# Patient Record
Sex: FEMALE | Race: WHITE | URBAN - METROPOLITAN AREA
[De-identification: names, ages, dates, MRNs, and addresses within clinical notes are randomized per-mention and may not be internally consistent; named-entity substitution may affect disease eponyms.]

---

## 2017-09-20 ENCOUNTER — HISTORICAL (OUTPATIENT)
Dept: INTERNAL MEDICINE | Facility: CLINIC | Age: 62
End: 2017-09-20

## 2017-09-20 LAB
ABS NEUT (OLG): 6.94 X10(3)/MCL (ref 2.1–9.2)
ALBUMIN SERPL-MCNC: 3.7 GM/DL (ref 3.4–5)
ALBUMIN/GLOB SERPL: 1 RATIO (ref 1–2)
ALP SERPL-CCNC: 86 UNIT/L (ref 45–117)
ALT SERPL-CCNC: 19 UNIT/L (ref 12–78)
AST SERPL-CCNC: 13 UNIT/L (ref 15–37)
BASOPHILS # BLD AUTO: 0.04 X10(3)/MCL
BASOPHILS NFR BLD AUTO: 0 % (ref 0–1)
BILIRUB SERPL-MCNC: 0.6 MG/DL (ref 0.2–1)
BILIRUBIN DIRECT+TOT PNL SERPL-MCNC: 0.2 MG/DL
BILIRUBIN DIRECT+TOT PNL SERPL-MCNC: 0.4 MG/DL
BUN SERPL-MCNC: 22 MG/DL (ref 7–18)
CALCIUM SERPL-MCNC: 9.1 MG/DL (ref 8.5–10.1)
CHLORIDE SERPL-SCNC: 101 MMOL/L (ref 98–107)
CHOLEST SERPL-MCNC: 149 MG/DL
CHOLEST/HDLC SERPL: 3 {RATIO} (ref 0–4.4)
CO2 SERPL-SCNC: 29 MMOL/L (ref 21–32)
CREAT SERPL-MCNC: 1.5 MG/DL (ref 0.6–1.3)
EOSINOPHIL # BLD AUTO: 0.2 X10(3)/MCL
EOSINOPHIL NFR BLD AUTO: 2 % (ref 0–5)
ERYTHROCYTE [DISTWIDTH] IN BLOOD BY AUTOMATED COUNT: 12.3 % (ref 11.5–14.5)
EST. AVERAGE GLUCOSE BLD GHB EST-MCNC: 189 MG/DL
GLOBULIN SER-MCNC: 3.9 GM/ML (ref 2.3–3.5)
GLUCOSE SERPL-MCNC: 241 MG/DL (ref 74–106)
HAV IGM SERPL QL IA: NONREACTIVE
HBA1C MFR BLD: 8.2 % (ref 4.2–6.3)
HBV CORE IGM SERPL QL IA: NONREACTIVE
HBV SURFACE AG SERPL QL IA: NEGATIVE
HCT VFR BLD AUTO: 42.8 % (ref 35–46)
HCV AB SERPL QL IA: NONREACTIVE
HDLC SERPL-MCNC: 50 MG/DL
HGB BLD-MCNC: 14.7 GM/DL (ref 12–16)
HIV 1+2 AB+HIV1 P24 AG SERPL QL IA: NONREACTIVE
IMM GRANULOCYTES # BLD AUTO: 0.02 10*3/UL
IMM GRANULOCYTES NFR BLD AUTO: 0 %
LDLC SERPL CALC-MCNC: 50 MG/DL (ref 0–130)
LYMPHOCYTES # BLD AUTO: 2.15 X10(3)/MCL
LYMPHOCYTES NFR BLD AUTO: 22 % (ref 15–40)
MCH RBC QN AUTO: 30.2 PG (ref 26–34)
MCHC RBC AUTO-ENTMCNC: 34.3 GM/DL (ref 31–37)
MCV RBC AUTO: 88.1 FL (ref 80–100)
MONOCYTES # BLD AUTO: 0.5 X10(3)/MCL
MONOCYTES NFR BLD AUTO: 5 % (ref 4–12)
NEUTROPHILS # BLD AUTO: 6.94 X10(3)/MCL
NEUTROPHILS NFR BLD AUTO: 70 X10(3)/MCL
PLATELET # BLD AUTO: 314 X10(3)/MCL (ref 130–400)
PMV BLD AUTO: 9.9 FL (ref 7.4–10.4)
POTASSIUM SERPL-SCNC: 4.3 MMOL/L (ref 3.5–5.1)
PROT SERPL-MCNC: 7.6 GM/DL (ref 6.4–8.2)
RBC # BLD AUTO: 4.86 X10(6)/MCL (ref 4–5.2)
SODIUM SERPL-SCNC: 138 MMOL/L (ref 136–145)
T4 FREE SERPL-MCNC: 1.32 NG/DL (ref 0.76–1.46)
TRIGL SERPL-MCNC: 245 MG/DL
TSH SERPL-ACNC: 3.84 MIU/L (ref 0.36–3.74)
VLDLC SERPL CALC-MCNC: 49 MG/DL
WBC # SPEC AUTO: 9.8 X10(3)/MCL (ref 4.5–11)

## 2018-02-20 ENCOUNTER — HISTORICAL (OUTPATIENT)
Dept: ADMINISTRATIVE | Facility: HOSPITAL | Age: 63
End: 2018-02-20

## 2018-02-20 LAB
APPEARANCE, UA: ABNORMAL
BACTERIA #/AREA URNS AUTO: ABNORMAL /[HPF]
BILIRUB UR QL STRIP: NEGATIVE
COLOR UR: YELLOW
GLUCOSE (UA): NORMAL
HGB UR QL STRIP: 0.06 MG/DL
HYALINE CASTS #/AREA URNS LPF: ABNORMAL /[LPF]
KETONES UR QL STRIP: NEGATIVE
LEUKOCYTE ESTERASE UR QL STRIP: 500 LEU/UL
NITRITE UR QL STRIP: NEGATIVE
PH UR STRIP: 5 [PH] (ref 4.5–8)
PROT UR QL STRIP: 20 MG/DL
RBC #/AREA URNS AUTO: ABNORMAL /[HPF]
SP GR UR STRIP: 1.01 (ref 1–1.03)
SQUAMOUS #/AREA URNS LPF: >100 /[LPF]
UROBILINOGEN UR STRIP-ACNC: NORMAL
WBC #/AREA URNS AUTO: ABNORMAL /HPF

## 2018-02-22 LAB — FINAL CULTURE: NORMAL

## 2018-05-04 ENCOUNTER — HISTORICAL (OUTPATIENT)
Dept: ADMINISTRATIVE | Facility: HOSPITAL | Age: 63
End: 2018-05-04

## 2018-05-07 ENCOUNTER — HISTORICAL (OUTPATIENT)
Dept: ADMINISTRATIVE | Facility: HOSPITAL | Age: 63
End: 2018-05-07

## 2018-05-09 LAB — FINAL CULTURE: NO GROWTH

## 2018-08-21 ENCOUNTER — HISTORICAL (OUTPATIENT)
Dept: INTERNAL MEDICINE | Facility: CLINIC | Age: 63
End: 2018-08-21

## 2018-08-21 LAB
APPEARANCE, UA: ABNORMAL
BACTERIA #/AREA URNS AUTO: ABNORMAL /[HPF]
BILIRUB UR QL STRIP: NEGATIVE
COLOR UR: YELLOW
CREAT UR-MCNC: 275 MG/DL
EST. AVERAGE GLUCOSE BLD GHB EST-MCNC: 146 MG/DL
GLUCOSE (UA): NORMAL
HBA1C MFR BLD: 6.7 % (ref 4.2–6.3)
HGB UR QL STRIP: NEGATIVE
HYALINE CASTS #/AREA URNS LPF: ABNORMAL /[LPF]
KETONES UR QL STRIP: NEGATIVE
LEUKOCYTE ESTERASE UR QL STRIP: 500 LEU/UL
MICROALBUMIN UR-MCNC: 81 MG/L (ref 0–19)
MICROALBUMIN/CREAT RATIO PNL UR: 29.5 MCG/MG CR (ref 0–29)
NITRITE UR QL STRIP: ABNORMAL
PH UR STRIP: 5.5 [PH] (ref 4.5–8)
PROT UR QL STRIP: 30 MG/DL
RBC #/AREA URNS AUTO: ABNORMAL /[HPF]
SP GR UR STRIP: 1.02 (ref 1–1.03)
SQUAMOUS #/AREA URNS LPF: >100 /[LPF]
TSH SERPL-ACNC: 3.09 MIU/L (ref 0.36–3.74)
UROBILINOGEN UR STRIP-ACNC: NORMAL
WBC #/AREA URNS AUTO: >=100 /HPF

## 2018-08-22 ENCOUNTER — HISTORICAL (OUTPATIENT)
Dept: ADMINISTRATIVE | Facility: HOSPITAL | Age: 63
End: 2018-08-22

## 2022-04-10 ENCOUNTER — HISTORICAL (OUTPATIENT)
Dept: ADMINISTRATIVE | Facility: HOSPITAL | Age: 67
End: 2022-04-10

## 2022-04-27 VITALS
HEIGHT: 65 IN | DIASTOLIC BLOOD PRESSURE: 78 MMHG | BODY MASS INDEX: 39.2 KG/M2 | SYSTOLIC BLOOD PRESSURE: 120 MMHG | WEIGHT: 235.25 LBS

## 2022-05-04 NOTE — HISTORICAL [facility] CERNER
This is a historical note converted from Jaqui. Formatting and pictures may have been removed.  Please reference Ceraram for original formatting and attached multimedia. Chief Complaint  annual  History of Present Illness  Pt is  (hx of SABx1 and EABx1) here for annual gyn exam. Reports hx of abnormal pap in the  treated with cryotherapy. Last pap approx 14 years ago. Pt reports had uterine ablation in  secondary to AUB. Denies any vaginal bleeding since procedure. Denies vaginal discharge. States has not been sexually active in years but is getting  in April. Denies breast complaints. C/o mixed urinary incontinence. Denies dysuria. Denies hx of bladder/renal stones. Reports hx of CVA with residual right sided numbness. State does have sensation when she needs to urinate. Reports had UTI in December and was asymptomatic when discovered. Denies hx of recurring UTIs.?Pt is nonsmoker. Denies fly hx of breast, ovarian or uterine cancer. States sibling with hx of vulvar cancer.  Review of Systems  Negative except HPI  Physical Exam  Vitals & Measurements  T:?36.6? ?C (Oral)? RR:?18? BP:?129/69?  HT:?162?cm? HT:?162?cm? WT:?107.6?kg? WT:?107.6?kg? BMI:?41?  General: Alert and oriented, No acute distress.  Breast: No mass, No tenderness, No discharge.  Gastrointestinal: Soft, Non-tender.  Gynecology:  Labia: Bilateral, Majora, Minora, Within normal limits.  Vagina: atrophic mucosa  Cervix: No cervical motion tenderness, No lesions.  Uterus: Mobile, Not tender.  Ovaries: Not tender, No mass.  pt able to maintain urinary continence with valsava  Integumentary: Warm, Dry.  Neurologic: Alert, Oriented.  Psychiatric: Cooperative, Appropriate mood & affect.  Assessment/Plan  1.?Pap smear for cervical cancer screening  ? -pap,hpv  rtc one year  Ordered:  Clinic Follow up  Pathology Gyn Request  Preventative Health Care Est 40-64 years [id number] PC  ?  2.?Visit for screening mammogram  Ordered:    Screening  Preventative Health Care Est 40-64 years [id number] PC  ?  3.?Urinary incontinence, mixed  Ordered:  Internal Referral to Uro-Gynecology  Preventative Health Care Est 40-64 years [id number] PC  Urinalysis with Microscopic if Indicated  Urine Culture [id number]  ?   Problem List/Past Medical History  Ongoing  Apnea, sleep  Boil, back  CKD (chronic kidney disease) stage 2, GFR 60-89 ml/min  CKD (chronic kidney disease) stage 3, GFR 30-59 ml/min  Depression  Diabetes  Diabetes  DM neuropathy, painful  Gout  H/O: stroke  HLD (hyperlipidemia)  HTN (hypertension)  Hypothyroidism  Renal failure  Well adult exam  Historical  Hyperlipidemia  Procedure/Surgical History  Ablation (2003), Dilation and curettage (2003), Tubal ligation.  Medications  allopurinol 100 mg oral tablet, 100 mg= 1 tab(s), Oral, Daily  BD Ultra fine pen needles, See Instructions, 4 refills  CALCITRIOL 0.25MG, Daily  Celexa 20 mg oral tablet, 20 mg= 1 tab(s), Oral, Daily, 4 refills  Coreg 12.5 mg oral tablet, 12.5 mg= 1 tab(s), Oral, BID, 4 refills  Furosemide ORAL solution, 20 mg, Oral, Daily  gabapentin 100 mg oral capsule, 100 mg= 1 cap(s), Oral, TID  glimepiride 4 mg oral tablet, 4 mg= 1 tab(s), Oral, BID, 4 refills  Lantus Solostar Pen 100 units/mL subcutaneous solution, See Instructions, 4 refills  levothyroxine 100 mcg (0.1 mg) oral tablet, 100 mcg= 1 tab(s), Oral, Daily, 4 refills  linagliptin 5 mg oral tablet, 5 mg, Oral, Daily, 4 refills  losartan 50 mg oral tablet, 50 mg= 1 tab(s), Oral, Daily  rosuvastatin 20 mg oral tablet, 20 mg= 1 tab(s), Oral, Daily, 4 refills  Vitamin D2 50,000 intl units oral capsule, 33712 IntUnit= 1 cap(s), Oral, qMonday  Allergies  No Known Medication Allergies  Social History  Alcohol  Never, 09/24/2015  Employment/School  Unemployed, Work/School description: works at [employer]., 07/18/2017  Unemployed, 07/19/2016  Exercise  Self assessment: Fair condition., 07/19/2016  Home/Environment  Lives with Significant other.  Living situation: Home/Independent. Home equipment: Glucose monitoring. Alcohol abuse in household: No. Substance abuse in household: No. Smoker in household: Yes. Injuries/Abuse/Neglect in household: No. Feels unsafe at home: No., 07/19/2016  Nutrition/Health  Diabetic, Sleeping concerns: Yes. Feels highly stressed: No., 01/23/2018  Regular, Diabetic, Sleeping concerns: Yes. Feels highly stressed: Yes., 07/18/2017  Substance Abuse  Never, 09/24/2015  Tobacco - Denies Tobacco Use, 12/15/2014  Never smoker, 07/19/2016  Family History  Arthritis: Mother.  Heart attack: Father.  Heart disease: Father.  Hypertension.: Mother.

## 2022-05-04 NOTE — HISTORICAL [facility] CERNER
This is a historical note converted from Ceraram. Formatting and pictures may have been removed.  Please reference Cerner for original formatting and attached multimedia. Chief Complaint  had a stroke 10 years ago  History of Present Illness  63 year old white female, here to establish PCP. Past provider manjit RAMIREZ?Jim NP in [facility].?PMH of?BHASKAR, CVA 2008, DM, HTN, HLD, hypothyroidism, and JOSUE. Nonsmoker. Pt was previously followed by Dr. Kelly for DM and is currently still under the care of Dr. Diallo. Pt states she was told to start Aspirin 81 mg daily but has stopped taking this for some time. Since the stroke, pt reports mild right sided deficits. The patient states that she has a drip and dribbling all day that requires her to wear a pad. States that she has 3 voids per night. Reports an odor to urine and increase in frequency of urination in the past 2 wks. She reports numerous hypoglycemic episodes in the past 3 months, so she stopped taking insulin and is only on oral DM meds for the past 3 months. Denies chest pain, shortness of breath,?cough, fever, headache,?dizziness, weakness, abdominal pain, nausea,?vomiting, diarrhea, constipation, depression, anxiety.  Review of Systems  Constitutional: negative except as stated in HPI  Eye: negative except as stated in HPI  ENMT: negative except as stated in HPI  Respiratory: negative except as stated in HPI  Cardiovascular: negative except as stated in HPI  Gastrointestinal: negative except as stated in HPI  Genitourinary: negative except as stated in HPI  Hema/Lymph: negative except as stated in HPI  Endocrine: negative except as stated in HPI  Immunologic: negative except as stated in HPI  Musculoskeletal: negative except as stated in HPI  Integumentary: negative except as stated in HPI  Neurologic: negative except as stated in HPI  ?   All Other ROS_ ?negative except as stated in HPI  Physical Exam  Vitals & Measurements  T:?36.5? ?C (Oral)? HR:?71(Peripheral)?  RR:?18? BP:?120/78?  HT:?164?cm? HT:?164?cm? WT:?106.7?kg? WT:?106.7?kg? BMI:?39.67?  General: Alert and oriented, No acute distress.  Eye: Pupils are equal, round and reactive to light, Extraocular movements are intact, Normal conjunctiva.  HENT: Normocephalic, Normal hearing, Oral mucosa is moist, No pharyngeal erythema.  Neck: Supple, Non-tender, No lymphadenopathy, No thyromegaly.  Respiratory: Lungs are clear to auscultation, Respirations are non-labored, Breath sounds are equal, Symmetrical chest wall expansion.  Cardiovascular: Normal rate, Regular rhythm, No murmur, Normal peripheral perfusion, No edema.  Gastrointestinal: Soft, Non-tender, Non-distended, Normal bowel sounds, No organomegaly.  Genitourinary: No costovertebral angle tenderness, No inguinal tenderness.  Lymphatics: No lymphadenopathy neck, axilla, groin.  Musculoskeletal: Normal range of motion, Normal strength, No tenderness, Normal gait.  Neurologic: No focal deficits, Cranial Nerves II-XII are grossly intact.  Integumentary: Warm, Dry, Intact.  Feet:? 2+ pedal pulses bilaterally.  ?  Assessment/Plan  Diabetes  ?A1C 6.7, will repeat A1C in 3 months, continue oral meds. ADA diet.  Ordered:  Clinic Follow up, *Est. 02/22/19 7:00:00 CST, Order for future visit, Obesity, [facility]  Comprehensive Metabolic Panel, Routine collect, *Est. 11/22/18 3:00:00 CST, Blood, Order for future visit, *Est. Stop date 11/22/18 3:00:00 CST, Lab Collect, Diabetes  HLD (hyperlipidemia), 08/22/18 8:19:00 CDT  Comprehensive Metabolic Panel, Routine collect, *Est. 02/22/19 3:00:00 CST, Blood, Order for future visit, *Est. Stop date 02/22/19 3:00:00 CST, Lab Collect, Diabetes  HLD (hyperlipidemia), 08/22/18 8:21:00 CDT  Hemoglobin A1C [facility], Routine collect, *Est. 02/22/19 3:00:00 CST, Blood, Order for future visit, *Est. Stop date 02/22/19 3:00:00 CST, Lab Collect, Diabetes  HLD (hyperlipidemia), 08/22/18 8:21:00 CDT  Hemoglobin A1C [facility], Routine collect, *Est.  11/22/18 3:00:00 CST, Blood, Order for future visit, *Est. Stop date 11/22/18 3:00:00 CST, Lab Collect, Diabetes  HLD (hyperlipidemia), 08/22/18 8:19:00 CDT  Lipid Panel, Routine collect, *Est. 02/22/19 3:00:00 CST, Blood, Order for future visit, *Est. Stop date 02/22/19 3:00:00 CST, Lab Collect, Diabetes  HLD (hyperlipidemia), 08/22/18 8:21:00 CDT  Lipid Panel, Routine collect, *Est. 11/22/18 3:00:00 CST, Blood, Order for future visit, *Est. Stop date 11/22/18 3:00:00 CST, Lab Collect, Diabetes  HLD (hyperlipidemia), 08/22/18 8:19:00 CDT  Office/Outpatient Visit Level 4 Established 18067 PC, HTN (hypertension)  Diabetes  DM neuropathy, painful  Urinary incontinence, mixed  HLD (hyperlipidemia)  Hypothyroidism  Obesity, [facility], 08/22/18 8:19:00 CDT  ?  DM neuropathy, painful  ?gabapentin  Ordered:  Clinic Follow up, *Est. 02/22/19 7:00:00 CST, Order for future visit, Obesity, [facility]  Office/Outpatient Visit Level 4 Established 58225 PC, HTN (hypertension)  Diabetes  DM neuropathy, painful  Urinary incontinence, mixed  HLD (hyperlipidemia)  Hypothyroidism  Obesity, [facility], 08/22/18 8:19:00 CDT  ?  JOSUE (dyspnea on exertion)  ?CXR  ?  HLD (hyperlipidemia)  ?LDL 50, trig 245, repeat in 3 months. Continue statin. Low fat diet; more whole grain products, fruits, vegetables, lean meats, fish, and poultry.  Ordered:  Clinic Follow up, *Est. 02/22/19 7:00:00 CST, Order for future visit, Obesity, [facility]  Comprehensive Metabolic Panel, Routine collect, *Est. 11/22/18 3:00:00 CST, Blood, Order for future visit, *Est. Stop date 11/22/18 3:00:00 CST, Lab Collect, Diabetes  HLD (hyperlipidemia), 08/22/18 8:19:00 CDT  Comprehensive Metabolic Panel, Routine collect, *Est. 02/22/19 3:00:00 CST, Blood, Order for future visit, *Est. Stop date 02/22/19 3:00:00 CST, Lab Collect, Diabetes  HLD (hyperlipidemia), 08/22/18 8:21:00 CDT  Hemoglobin A1C [facility], Routine collect, *Est. 02/22/19 3:00:00  CST, Blood, Order for future visit, *Est. Stop date 02/22/19 3:00:00 CST, Lab Collect, Diabetes  HLD (hyperlipidemia), 08/22/18 8:21:00 CDT  Hemoglobin A1C [facility], Routine collect, *Est. 11/22/18 3:00:00 CST, Blood, Order for future visit, *Est. Stop date 11/22/18 3:00:00 CST, Lab Collect, Diabetes  HLD (hyperlipidemia), 08/22/18 8:19:00 CDT  Lipid Panel, Routine collect, *Est. 02/22/19 3:00:00 CST, Blood, Order for future visit, *Est. Stop date 02/22/19 3:00:00 CST, Lab Collect, Diabetes  HLD (hyperlipidemia), 08/22/18 8:21:00 CDT  Lipid Panel, Routine collect, *Est. 11/22/18 3:00:00 CST, Blood, Order for future visit, *Est. Stop date 11/22/18 3:00:00 CST, Lab Collect, Diabetes  HLD (hyperlipidemia), 08/22/18 8:19:00 CDT  Office/Outpatient Visit Level 4 Established [id number] PC, HTN (hypertension)  Diabetes  DM neuropathy, painful  Urinary incontinence, mixed  HLD (hyperlipidemia)  Hypothyroidism  Obesity, [facility], 08/22/18 8:19:00 CDT  ?  HTN (hypertension)  ?stable. med refills. DASH diet: Eat more fruits, vegetables, and low fat dairy foods. Low sodium diet.  Ordered:  Clinic Follow up, *Est. 02/22/19 7:00:00 CST, Order for future visit, Obesity, [facility]  Office/Outpatient Visit Level 4 Established 12407 PC, HTN (hypertension)  Diabetes  DM neuropathy, painful  Urinary incontinence, mixed  HLD (hyperlipidemia)  Hypothyroidism  Obesity, [facility], 08/22/18 8:19:00 CDT  ?  Hypothyroidism  ?TSH 3.0, continue levothyroxine  Ordered:  Clinic Follow up, *Est. 02/22/19 7:00:00 CST, Order for future visit, Obesity, [facility]  Office/Outpatient Visit Level 4 Established 56336 PC, HTN (hypertension)  Diabetes  DM neuropathy, painful  Urinary incontinence, mixed  HLD (hyperlipidemia)  Hypothyroidism  Obesity, [facility], 08/22/18 8:19:00 CDT  Thyroid Stimulating Hormone, Routine collect, *Est. 11/22/18 3:00:00 CST, Blood, Order for future visit, *Est. Stop date 11/22/18 3:00:00 CST,  Lab Collect, Hypothyroidism, 08/22/18 8:19:00 CDT  ?  Obesity  ?Low fat diet and 150 minutes?of aerobic exercise per week  Ordered:  Clinic Follow up, *Est. 02/22/19 7:00:00 CST, Order for future visit, Obesity, [facility]  Office/Outpatient Visit Level 4 Established 82814 PC, HTN (hypertension)  Diabetes  DM neuropathy, painful  Urinary incontinence, mixed  HLD (hyperlipidemia)  Hypothyroidism  Obesity, [facility], 08/22/18 8:19:00 CDT  ?  Urinary incontinence, mixed  ?UTI noted per UA. Start on Cipro. Increase fluids.  Ordered:  conjugated estrogens topical, 1 gm =, VAG, qWeek, # 42.5 gm, 12 Refill(s)  oxybutynin, 10 mg = 1 tab(s), Oral, Daily, # 90 tab(s), 4 Refill(s)  Clinic Follow up, *Est. 02/22/19 7:00:00 CST, Order for future visit, Obesity, [facility]  Office/Outpatient Visit Level 4 Established 15264 PC, HTN (hypertension)  Diabetes  DM neuropathy, painful  Urinary incontinence, mixed  HLD (hyperlipidemia)  Hypothyroidism  Obesity, [facility], 08/22/18 8:19:00 CDT  Urinalysis with Microscopic if Indicated, Routine collect, Urine, Order for future visit, *Est. 11/22/18 3:00:00 CST, *Est. Stop date 11/22/18 3:00:00 CST, Nurse collect, Urinary incontinence, mixed, 08/22/18 8:19:00 CDT  ?  Orders:  carvedilol, 12.5 mg = 1 tab(s), Oral, BID, # 180 tab(s), 4 Refill(s)  ciprofloxacin, 500 mg = 1 tab(s), Oral, q12hr, # 20 tab(s), 0 Refill(s), Pharmacy: [id number]  citalopram, 20 mg = 1 tab(s), Oral, Daily, # 90 tab(s), 4 Refill(s)  gabapentin, 100 mg = 1 cap(s), Oral, TID, # 90 cap(s), 4 Refill(s)  glimepiride, 4 mg = 1 tab(s), Oral, BID, # 180 tab(s), 4 Refill(s)  levothyroxine, 100 mcg = 1 tab(s), Oral, Daily, # 90 tab(s), 4 Refill(s)  linagliptin, 5 mg, Oral, Daily, # 90 tab(s), 4 Refill(s)  losartan, 50 mg = 1 tab(s), Oral, Daily, # 90 tab(s), 4 Refill(s)  rosuvastatin, 20 mg = 1 tab(s), Oral, Daily, # 90 tab(s), 4 Refill(s)  Meds refilled/printed/[facility]. RTC prn and 6 months.  Labs at 3 months and 6 months.   Problem List/Past Medical History  Ongoing  Apnea, sleep  Boil, back  CKD (chronic kidney disease) stage 2, GFR 60-89 ml/min  CKD (chronic kidney disease) stage 3, GFR 30-59 ml/min  Depression  Diabetes  Diabetes  DM neuropathy, painful  Gout  H/O: stroke  HLD (hyperlipidemia)  HTN (hypertension)  Hypothyroidism  Renal failure  Well adult exam  Historical  Hyperlipidemia  Procedure/Surgical History  Ablation (2003)  Dilation and curettage (2003)  Tubal ligation   Medications  allopurinol 100 mg oral tablet, 100 mg= 1 tab(s), Oral, Daily  BD Ultra fine pen needles, See Instructions, 4 refills,? ?Not taking: Last Dose Date/Time Unknown  CALCITRIOL 0.25MG, Daily  Celexa 20 mg oral tablet, 20 mg= 1 tab(s), Oral, Daily, 4 refills  Coreg 12.5 mg oral tablet, 12.5 mg= 1 tab(s), Oral, BID, 4 refills  Furosemide ORAL solution, 20 mg, Oral, Daily  gabapentin 100 mg oral capsule, 100 mg= 1 cap(s), Oral, TID  glimepiride 4 mg oral tablet, 4 mg= 1 tab(s), Oral, BID, 4 refills  Lantus Solostar Pen 100 units/mL subcutaneous solution, See Instructions, 4 refills,? ?Not taking: stop taking x 3 months  levothyroxine 100 mcg (0.1 mg) oral tablet, 100 mcg= 1 tab(s), Oral, Daily, 4 refills  linagliptin 5 mg oral tablet, 5 mg, Oral, Daily, 4 refills  losartan 50 mg oral tablet, 50 mg= 1 tab(s), Oral, Daily  oxybutynin 10 mg/24 hr oral tablet, extended release, 10 mg= 1 tab(s), Oral, Daily, 6 refills  Premarin 0.625 mg/g vaginal cream with applicator, 1 gm, VAG, qWeek, 12 refills  rosuvastatin 20 mg oral tablet, 20 mg= 1 tab(s), Oral, Daily, 4 refills  simethicone 80 mg oral tablet, chewable, 80 mg= 1 tab(s), Chewed, PRN,? ?Not taking: Last Dose Date/Time Unknown  triamcinolone 0.1% topical cream, 1 guillaume, TOP, BID, PRN,? ?Not taking: Last Dose Date/Time Unknown  Vitamin D2 50,000 intl units oral capsule, 26802 IntUnit= 1 cap(s), Oral, qMonday  Allergies  No Known Medication Allergies  Social  History  Alcohol  Never, 09/24/2015  Employment/School  Unemployed, Work/School description: works at [employer]., 07/18/2017  Unemployed, 07/19/2016  Exercise  Self assessment: Fair condition., 07/19/2016  Home/Environment  Lives with Significant other. Living situation: Home/Independent. Home equipment: Glucose monitoring. Alcohol abuse in household: No. Substance abuse in household: No. Smoker in household: Yes. Injuries/Abuse/Neglect in household: No. Feels unsafe at home: No., 07/19/2016  Nutrition/Health  Diabetic, Sleeping concerns: Yes. Feels highly stressed: No., 01/23/2018  Regular, Diabetic, Sleeping concerns: Yes. Feels highly stressed: Yes., 07/18/2017  Substance Abuse  Never, 09/24/2015  Tobacco - Denies Tobacco Use, 12/15/2014  Never smoker, 07/19/2016  Family History  Arthritis: Mother.  Heart attack: Father.  Heart disease: Father.  Hypertension.: Mother.  Health Maintenance  Health Maintenance  ???Pending?(in the next year)  ??? ??OverDue  ??? ? ? ?Coronary Artery Disease Maintenance-Lipid Lowering Therapy due??and every?  ??? ??Due?  ??? ? ? ?Alcohol Misuse Screening due??08/22/18??and every 1??year(s)  ??? ? ? ?Aspirin Therapy for CVD Prevention due??08/22/18??and every 1??year(s)  ??? ? ? ?Colorectal Screening due??08/22/18??and every?  ??? ? ? ?Coronary Artery Disease Maintenance-Antiplatelet Agent Prescribed due??08/22/18??and every?  ??? ? ? ?Diabetes Maintenance-Eye Exam due??08/22/18??and every?  ??? ? ? ?Diabetes Maintenance-Foot Exam due??08/22/18??and every?  ??? ? ? ?Smoking Cessation due??08/22/18??and every 1??year(s)  ??? ? ? ?Tetanus Vaccine due??08/22/18??and every 10??year(s)  ??? ? ? ?Zoster Vaccine due??08/22/18??and every 100??year(s)  ??? ??Due In Future?  ??? ? ? ?Diabetes Maintenance-Fasting Lipid Profile not due until??09/20/18??and every 1??year(s)  ??? ? ? ?Diabetes Maintenance-HgbA1c not due until??09/20/18??and every 1??year(s)  ??? ? ? ?Hypertension Management-BMP not  due until??09/20/18??and every 1??year(s)  ??? ? ? ?Diabetes Maintenance-Serum Creatinine not due until??09/20/18??and every 1??year(s)  ??? ? ? ?Blood Pressure Screening not due until??05/07/19??and every 1??year(s)  ??? ? ? ?Body Mass Index Check not due until??05/07/19??and every 1??year(s)  ??? ? ? ?Hypertension Management-Blood Pressure not due until??05/07/19??and every 1??year(s)  ??? ? ? ?Diabetes Maintenance-Microalbumin not due until??08/21/19??and every 1??year(s)  ??? ? ? ?Diabetes Maintenance-Urine Dipstick not due until??08/21/19??and every 1??year(s)  ???Satisfied?(in the past 1 year)  ??? ??Satisfied?  ??? ? ? ?Blood Pressure Screening on??08/22/18.??Satisfied by Ramsay LPN, [facility]  ??? ? ? ?Body Mass Index Check on??08/22/18.??Satisfied by Ramsay LPN, [facility]  ??? ? ? ?Breast Cancer Screening on??05/04/18.??Satisfied by Nathalie Tapia  ??? ? ? ?Cervical Cancer Screening on??02/20/18.??Satisfied by Catherine Vaca  ??? ? ? ?Coronary Artery Disease Maintenance-Lipid Lowering Therapy on??09/20/17.??Satisfied by Jim SCHUMACHER-Mayda CHANG  ??? ? ? ?Depression Screening on??08/22/18.??Satisfied by Ramsay LPN, [facility]  ??? ? ? ?Diabetes Maintenance-Fasting Lipid Profile on??09/20/17.??Satisfied by Teresita Fu  ??? ? ? ?Diabetes Maintenance-HgbA1c on??08/21/18.??Satisfied by Kodak Cosme Jr.  ??? ? ? ?Diabetes Maintenance-Microalbumin on??08/21/18.??Satisfied by [employer]ak Cosme Jr.  ??? ? ? ?Diabetes Maintenance-Serum Creatinine on??09/20/17.??Satisfied by Teresita Fu  ??? ? ? ?Diabetes Maintenance-Urine Dipstick on??08/21/18.??Satisfied by Aubrie Wells  ??? ? ? ?Diabetes Screening on??08/21/18.??Satisfied by [employer]  ??? ? ? ?Hypertension Management-Blood Pressure on??08/22/18.??Satisfied by Gomez Ramsay LPN  ??? ? ? ?Hypertension Management-BMP on??08/21/18.??Satisfied by [employer] Cosme Jr.  ??? ? ?  ?Lipid Screening on??09/20/17.??Satisfied by Teresita Fu  ??? ? ? ?Obesity Screening on??08/22/18.??Satisfied by Gomez Ramsay LPN  ?  ?